# Patient Record
Sex: MALE | Race: WHITE
[De-identification: names, ages, dates, MRNs, and addresses within clinical notes are randomized per-mention and may not be internally consistent; named-entity substitution may affect disease eponyms.]

---

## 2021-04-13 DIAGNOSIS — Z23 ENCOUNTER FOR IMMUNIZATION: ICD-10-CM

## 2025-02-04 ENCOUNTER — HOSPITAL ENCOUNTER (INPATIENT)
Dept: HOSPITAL 99 - 2 SOUTH | Age: 75
LOS: 6 days | Discharge: NURSING, REHAB OR LONG-TERM CARE | DRG: 481 | End: 2025-02-10
Payer: COMMERCIAL

## 2025-02-04 VITALS — RESPIRATION RATE: 20 BRPM

## 2025-02-04 VITALS — RESPIRATION RATE: 18 BRPM | DIASTOLIC BLOOD PRESSURE: 92 MMHG | SYSTOLIC BLOOD PRESSURE: 190 MMHG

## 2025-02-04 VITALS — RESPIRATION RATE: 20 BRPM | SYSTOLIC BLOOD PRESSURE: 169 MMHG | DIASTOLIC BLOOD PRESSURE: 87 MMHG

## 2025-02-04 VITALS — RESPIRATION RATE: 12 BRPM

## 2025-02-04 VITALS — SYSTOLIC BLOOD PRESSURE: 133 MMHG | DIASTOLIC BLOOD PRESSURE: 72 MMHG | RESPIRATION RATE: 20 BRPM

## 2025-02-04 VITALS — RESPIRATION RATE: 15 BRPM | SYSTOLIC BLOOD PRESSURE: 190 MMHG | DIASTOLIC BLOOD PRESSURE: 92 MMHG

## 2025-02-04 VITALS — BODY MASS INDEX: 35.9 KG/M2

## 2025-02-04 VITALS — RESPIRATION RATE: 16 BRPM

## 2025-02-04 DIAGNOSIS — Z87.891: ICD-10-CM

## 2025-02-04 DIAGNOSIS — E78.00: ICD-10-CM

## 2025-02-04 DIAGNOSIS — Z79.82: ICD-10-CM

## 2025-02-04 DIAGNOSIS — S72.141A: Primary | ICD-10-CM

## 2025-02-04 DIAGNOSIS — N17.9: ICD-10-CM

## 2025-02-04 DIAGNOSIS — Z88.8: ICD-10-CM

## 2025-02-04 DIAGNOSIS — E11.22: ICD-10-CM

## 2025-02-04 DIAGNOSIS — N18.32: ICD-10-CM

## 2025-02-04 DIAGNOSIS — D62: ICD-10-CM

## 2025-02-04 DIAGNOSIS — S50.311A: ICD-10-CM

## 2025-02-04 DIAGNOSIS — Z79.84: ICD-10-CM

## 2025-02-04 DIAGNOSIS — I12.9: ICD-10-CM

## 2025-02-04 DIAGNOSIS — E05.90: ICD-10-CM
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
## 2025-02-04 DIAGNOSIS — E66.09: ICD-10-CM

## 2025-02-04 DIAGNOSIS — Z79.4: ICD-10-CM
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
## 2025-02-04 LAB
ALBUMIN SERPL-MCNC: 4.9 G/DL (ref 3.5–5)
ALP SERPL-CCNC: 61 U/L (ref 38–126)
ALT SERPL-CCNC: 25 U/L (ref 0–50)
AST SERPL-CCNC: 26 U/L (ref 17–59)
BUN SERPL-MCNC: 33 MG/DL (ref 9–20)
CALCIUM SERPL-MCNC: 9 MG/DL (ref 8.4–10.2)
CHLORIDE SERPL-SCNC: 104 MMOL/L (ref 98–107)
CO2 SERPL-SCNC: 25 MMOL/L (ref 22–30)
EGFR: 42.04
ERYTHROCYTE [DISTWIDTH] IN BLOOD BY AUTOMATED COUNT: 12.8 % (ref 11.5–14.5)
ESTIMATED CREATININE CLEARANCE: 44 ML/MIN
GLUCOSE - POINT OF CARE: 120 MG/DL (ref 70–99)
GLUCOSE - POINT OF CARE: 282 MG/DL (ref 70–99)
GLUCOSE SERPL-MCNC: 151 MG/DL (ref 70–99)
HCT VFR BLD AUTO: 41.2 % (ref 39–52)
HGB BLD-MCNC: 13.3 G/DL (ref 13–18)
MCHC RBC AUTO-ENTMCNC: 32.3 G/DL (ref 33–37)
MCV RBC AUTO: 93.2 FL (ref 80–94)
NRBC BLD AUTO-RTO: 0 %
PLATELET # BLD AUTO: 196 10^3/UL (ref 130–400)
POTASSIUM SERPL-SCNC: 5 MMOL/L (ref 3.5–5.1)
PROT SERPL-MCNC: 7.8 G/DL (ref 6.3–8.2)
SODIUM SERPL-SCNC: 140 MMOL/L (ref 135–145)

## 2025-02-04 PROCEDURE — C1713 ANCHOR/SCREW BN/BN,TIS/BN: HCPCS

## 2025-02-04 RX ADMIN — HYDROMORPHONE HYDROCHLORIDE 0.5 MG: 1 INJECTION, SOLUTION INTRAMUSCULAR; INTRAVENOUS; SUBCUTANEOUS at 17:26

## 2025-02-04 RX ADMIN — INSULIN GLARGINE 0.12 UNITS: 100 INJECTION, SOLUTION SUBCUTANEOUS at 21:32

## 2025-02-04 RX ADMIN — INSULIN ASPART: 100 INJECTION, SOLUTION INTRAVENOUS; SUBCUTANEOUS at 18:35

## 2025-02-04 RX ADMIN — REPAGLINIDE 1 MG: 1 TABLET ORAL at 18:35

## 2025-02-04 RX ADMIN — HYDROMORPHONE HYDROCHLORIDE 0.5 MG: 1 INJECTION, SOLUTION INTRAMUSCULAR; INTRAVENOUS; SUBCUTANEOUS at 13:10

## 2025-02-04 RX ADMIN — HYDROMORPHONE HYDROCHLORIDE 0.5 MG: 1 INJECTION, SOLUTION INTRAMUSCULAR; INTRAVENOUS; SUBCUTANEOUS at 23:12

## 2025-02-04 RX ADMIN — SODIUM CHLORIDE 1000: 900 INJECTION, SOLUTION INTRAVENOUS at 19:51

## 2025-02-04 RX ADMIN — CEFAZOLIN 10: 10 INJECTION, POWDER, FOR SOLUTION INTRAVENOUS at 21:32

## 2025-02-04 RX ADMIN — CARVEDILOL 12.5 MG: 12.5 TABLET, FILM COATED ORAL at 19:51

## 2025-02-04 RX ADMIN — ACETAMINOPHEN 1000 MG: 500 TABLET ORAL at 12:13

## 2025-02-04 NOTE — PTCARENOTE
At 16:50 patient arrived from ED on stretcher, pulled onto bed with assist x3, static air overlay in place, VSS, admission history obtained at bedside.

## 2025-02-04 NOTE — HPS.HSE
"Addendum entered and electronically signed by Marissa Barlow DO  02/04/25 15:35: "~"The patient is seen and examined. I have reviewed the patient with NP, Kiera, and I agree with her history and physical, assessment and plan of care as per below. The patient has right hip pain, that is greatly improved following IV Dilaudid in "~"the ED. "~"Vitals reviewed as per below"~"PE: "~"NAD"~"CV: RRR, no m/r/g"~"Lungs: CTA b/l no w/r/r"~"Abd: Soft, nt/nd, normal bs"~"neurovascular-intact distal pulses, no focal neurologic deficits"~"Right intertrochanteric fracture-I agree with assessment and plan of care as per below, with following additions to order:"~"#Intertrochanteric fx following mechanical fall"~"-add neurovascular checks"~"-continue pain meds prn"~"-PCDs"~"-hold aspirin for now"~"#Acute renal failure on chronic kidney disease, likely partly pre-renal"~"-will give IVF and repeat BMP in am, baseline creatinine likely 1.3"~"-hold metformin"~"-hold losartan for now"~"Original Note:"~"Family Physician"~"-"~"-: "~"Family Physician:  Wanda Ko PA-C"~"Chief Complaint"~"-"~"-: "~"fall"~"History of Present Illness"~"-: "~"Patient is a 74-year-old male with past medical history significant for hypertension, DM II, hypercholesterolemia, CKD stage III, and hyperthyroidism who presented to [place] ED for evaluation right hip pain s/p fall. Patient reports that he "~"tripped [place] to [facility]. He currently reports no pain after medication in ED. He denies hitting his head and any LOC. He denies any recent sick contact, fevers, chills, cough, shortness of breath, chest pain, nausea, vomiting, "~"constipation, diarrhea or urinary symptoms. "~"Medical History"~"Past Medical History"~"Past Medical History: Reports Other"~"Additional Past Medical History: "~"benign hypertension"~"DM II"~"hypercholesterolemia"~"CKD stage III"~"hyperthyroidism"~"Past Surgical History: Reports Other"~"Additional Past Surgical History: "~"back surgery"~"bladder surgery to remove mass"~"Left ankle repair"~"Social History"~"Tobacco: Former Smoker (quit 2-3 years ago, 30-40 pack year history )"~"Alcohol: Occasional (couple beers a couple times a week)"~"Drug: None"~"Personal: "~"Living: With Family"~"Employment: Retired"~"Family History"~"Family History: Not pertinent"~"Allergies / Home Medications"~"-: "~"Allergies reflects when Allergies were last updated in [employer]."~"Home Medications with original date entered in [employer] "~"Allergy/Medication List: "~"Allergies"~"Allergy/AdvReac	Type	Severity	Reaction	Status	Date / Time"~"atorvastatin	Allergy		Unknown	Verified	11/16/22 15:52"~"Home Medications"~"rosuvastatin 10 mg tablet (Crestor) 10 mg PO DAILY 02/21/14 "~"insulin glargine 100 unit/mL (3 mL) subcutaneous pen (Lantus Solostar U-100 Insulin) 25 unit SC HS 11/13/22 "~"metformin 500 mg tablet 500 mg PO BIDWMEAL 11/13/22 "~"aspirin 81 mg tablet,delayed release 81 mg PO DAILY 11/16/22 "~"carvedilol 12.5 mg tablet 12.5 mg PO BID 11/16/22 "~"amlodipine 5 mg tablet 5 mg PO DAILY #30 tabs 11/19/22 "~"methimazole 5 mg tablet 5 mg PO DAILY #30 tabs 11/19/22 "~"empagliflozin 10 mg tablet (Jardiance) 10 mg PO DAILY 02/04/25 "~"losartan 50 mg tablet 50 mg PO DAILY 02/04/25 "~"repaglinide 1 mg tablet 1 mg PO AC 02/04/25 "~"Review of Systems"~"-"~"History Source: Patient"~"Constitutional: Reports No Symptoms"~"EENT: Reports No Symptoms"~"Respiratory: Reports No Symptoms"~"Cardiac: Reports No Symptoms"~"Abdomen/GI: Reports No Symptoms"~": Reports No Symptoms"~"Musculoskeletal: Reports Joint Pain (right hip)"~"Skin: Reports No Symptoms"~"Neurological: Reports No Symptoms"~"Endocrine: Reports No Symptoms"~"Hematologic/Lymphatic: Reports No Symptoms"~"Psych: Reports No Symptoms"~"Physical Exam"~"Vital Signs"~"-: "~"Vital Signs"~"Temp	Pulse	Resp	BP	Pulse Ox"~" 98.0 F 	 56 	 12 	 190/92 	 98 "~" 02/04/25 11:43	 02/04/25 12:15	 02/04/25 12:15	 02/04/25 11:46	 02/04/25 12:15"~"Physical Exam"~"General: Well Developed, Well Nourished, No Apparent Distress, Comfortable, Conversant and Obese"~"HEENT: NormoCephalic, Moist mucous membranes, Atraumatic, PERRLA, Pink Conjunctivae, Nose Appears Normal and Ears Appear Normal"~"Respiratory: Clear and Non Labored Respirations"~"Cardiac: S1/S2 and Regular Rhythm; No Murmur, Rub or Gallop"~"Breast: Deferred by me"~"GI: Soft, Non Tender, Non Distended and Normal Bowel Sounds; No Organomegaly"~"Rectal: Deferred by Provider"~"Genito-urinary: Deferred by me"~"Musculoskeletal: No Clubbing, No Cyanosis, No Edema and Other (limited ROM to right hip, externally rotated )"~"Skin: IV/Catheter Site; No Rash"~"Neuro: Awake, Alert, AO x 3 and Nonfocal/grossly intact"~"Psych: Calm and Intact Judgment/Insight"~"Laboratory Results"~"-"~"-: "~"02/04/25 12:53 "~"02/04/25 12:53 "~"Laboratory Results"~"Total Bilirubin	 0.6 mg/dl (0.2-1.3)  	02/04/25  12:53    "~"AST	 26 U/L (17-59)  	02/04/25  12:53    "~"ALT	 25 U/L (0-50)  	02/04/25  12:53    "~"Alkaline Phosphatase	 61 U/L ()  	02/04/25  12:53    "~"Data Reviewed"~"-"~"Diagnostic Radiology: Report Reviewed by me (Right Hip: Comminuted intertrochanteric fracture, proximal right femur.; Right femur: Intertrochanteric fracture, proximal right femur.)"~"Lab Data: Labs Reviewed by me (BUN 33, Creat 1.7)"~"Impression/Plan"~"-"~"-: "~"IMPRESSION/PLAN: "~"#mechanical fall "~"Rt Hip x-ray: Comminuted intertrochanteric fracture, proximal right femur."~"Rt Femur x-ray: Intertrochanteric fracture, proximal right femur."~" - Admit to med/surg"~" - consult ortho"~" - NPO after midnight "~" - PT/OT"~"#benign hypertension"~" - continue amlodipine, carvedilol, and losartan "~"#DM II"~" - AccuCheck AC & HS"~" - SSI"~" - continue empagliflozin, Lantus and repaglinide"~" - hold metformin"~" "~"#hypercholesterolemia"~" - continue rosuvastatin"~"#CKD stage III"~"BUN 33, Creat 1.7"~" - monitor BMP"~"#hyperthyroidism"~" - continue methimazole "~"Code status: Full Code"~"DVT prophylaxis: DVT Prophylaxis"

## 2025-02-05 VITALS — SYSTOLIC BLOOD PRESSURE: 131 MMHG | DIASTOLIC BLOOD PRESSURE: 92 MMHG | RESPIRATION RATE: 18 BRPM

## 2025-02-05 VITALS — RESPIRATION RATE: 18 BRPM | SYSTOLIC BLOOD PRESSURE: 114 MMHG | DIASTOLIC BLOOD PRESSURE: 81 MMHG

## 2025-02-05 VITALS — SYSTOLIC BLOOD PRESSURE: 137 MMHG | RESPIRATION RATE: 18 BRPM | DIASTOLIC BLOOD PRESSURE: 80 MMHG

## 2025-02-05 VITALS
SYSTOLIC BLOOD PRESSURE: 134 MMHG | RESPIRATION RATE: 8 BRPM | DIASTOLIC BLOOD PRESSURE: 73 MMHG | OXYGEN SATURATION: 2 %

## 2025-02-05 VITALS — RESPIRATION RATE: 15 BRPM

## 2025-02-05 VITALS — SYSTOLIC BLOOD PRESSURE: 164 MMHG | DIASTOLIC BLOOD PRESSURE: 80 MMHG | RESPIRATION RATE: 20 BRPM

## 2025-02-05 VITALS — DIASTOLIC BLOOD PRESSURE: 83 MMHG | RESPIRATION RATE: 20 BRPM | SYSTOLIC BLOOD PRESSURE: 167 MMHG

## 2025-02-05 VITALS — SYSTOLIC BLOOD PRESSURE: 152 MMHG | DIASTOLIC BLOOD PRESSURE: 99 MMHG | OXYGEN SATURATION: 2 %

## 2025-02-05 VITALS — RESPIRATION RATE: 12 BRPM

## 2025-02-05 VITALS
OXYGEN SATURATION: 2 % | DIASTOLIC BLOOD PRESSURE: 74 MMHG | RESPIRATION RATE: 13 BRPM | SYSTOLIC BLOOD PRESSURE: 134 MMHG

## 2025-02-05 VITALS — SYSTOLIC BLOOD PRESSURE: 146 MMHG | RESPIRATION RATE: 16 BRPM | DIASTOLIC BLOOD PRESSURE: 82 MMHG

## 2025-02-05 VITALS — OXYGEN SATURATION: 2 %

## 2025-02-05 LAB
BUN SERPL-MCNC: 33 MG/DL (ref 9–20)
CALCIUM SERPL-MCNC: 8.6 MG/DL (ref 8.4–10.2)
CHLORIDE SERPL-SCNC: 105 MMOL/L (ref 98–107)
CO2 SERPL-SCNC: 23 MMOL/L (ref 22–30)
EGFR: 42.04
ERYTHROCYTE [DISTWIDTH] IN BLOOD BY AUTOMATED COUNT: 13 % (ref 11.5–14.5)
ESTIMATED CREATININE CLEARANCE: 44 ML/MIN
GLUCOSE - POINT OF CARE: 105 MG/DL (ref 70–99)
GLUCOSE - POINT OF CARE: 121 MG/DL (ref 70–99)
GLUCOSE - POINT OF CARE: 134 MG/DL (ref 70–99)
GLUCOSE - POINT OF CARE: 141 MG/DL (ref 70–99)
GLUCOSE - POINT OF CARE: 195 MG/DL (ref 70–99)
GLUCOSE SERPL-MCNC: 228 MG/DL (ref 70–99)
HBA1C MFR BLD: 8.2 % (ref 4–5.6)
HCT VFR BLD AUTO: 37.5 % (ref 39–52)
HGB BLD-MCNC: 12.3 G/DL (ref 13–18)
MCHC RBC AUTO-ENTMCNC: 32.8 G/DL (ref 33–37)
MCV RBC AUTO: 92.6 FL (ref 80–94)
PLATELET # BLD AUTO: 189 10^3/UL (ref 130–400)
POTASSIUM SERPL-SCNC: 4.4 MMOL/L (ref 3.5–5.1)
SODIUM SERPL-SCNC: 139 MMOL/L (ref 135–145)

## 2025-02-05 PROCEDURE — 0QS636Z REPOSITION RIGHT UPPER FEMUR WITH INTRAMEDULLARY INTERNAL FIXATION DEVICE, PERCUTANEOUS APPROACH: ICD-10-PCS | Performed by: ORTHOPAEDIC SURGERY

## 2025-02-05 RX ADMIN — HYDROMORPHONE HYDROCHLORIDE 0.25 MG: 0.25 INJECTION, SOLUTION INTRAMUSCULAR; INTRAVENOUS; SUBCUTANEOUS at 20:06

## 2025-02-05 RX ADMIN — SODIUM CHLORIDE 1000: 900 INJECTION, SOLUTION INTRAVENOUS at 07:27

## 2025-02-05 RX ADMIN — CARVEDILOL 12.5 MG: 12.5 TABLET, FILM COATED ORAL at 21:31

## 2025-02-05 RX ADMIN — INSULIN ASPART: 100 INJECTION, SOLUTION INTRAVENOUS; SUBCUTANEOUS at 17:48

## 2025-02-05 RX ADMIN — INSULIN GLARGINE 0.12 UNITS: 100 INJECTION, SOLUTION SUBCUTANEOUS at 21:54

## 2025-02-05 RX ADMIN — HYDROMORPHONE HYDROCHLORIDE 0.5 MG: 1 INJECTION, SOLUTION INTRAMUSCULAR; INTRAVENOUS; SUBCUTANEOUS at 20:28

## 2025-02-05 RX ADMIN — METHIMAZOLE 5 MG: 5 TABLET ORAL at 08:51

## 2025-02-05 RX ADMIN — REPAGLINIDE: 1 TABLET ORAL at 11:49

## 2025-02-05 RX ADMIN — HYDROMORPHONE HYDROCHLORIDE 0.5 MG: 1 INJECTION, SOLUTION INTRAMUSCULAR; INTRAVENOUS; SUBCUTANEOUS at 20:13

## 2025-02-05 RX ADMIN — HYDROMORPHONE HYDROCHLORIDE 0.5 MG: 1 INJECTION, SOLUTION INTRAMUSCULAR; INTRAVENOUS; SUBCUTANEOUS at 13:27

## 2025-02-05 RX ADMIN — CARVEDILOL 12.5 MG: 12.5 TABLET, FILM COATED ORAL at 08:52

## 2025-02-05 RX ADMIN — HYDROMORPHONE HYDROCHLORIDE 0.5 MG: 1 INJECTION, SOLUTION INTRAMUSCULAR; INTRAVENOUS; SUBCUTANEOUS at 08:54

## 2025-02-05 RX ADMIN — INSULIN ASPART: 100 INJECTION, SOLUTION INTRAVENOUS; SUBCUTANEOUS at 11:45

## 2025-02-05 RX ADMIN — REPAGLINIDE: 1 TABLET ORAL at 08:52

## 2025-02-05 RX ADMIN — AMLODIPINE BESYLATE 5 MG: 5 TABLET ORAL at 08:51

## 2025-02-05 RX ADMIN — ANTI-FUNGAL POWDER MICONAZOLE NITRATE TALC FREE 1 APPLIC: 1.42 POWDER TOPICAL at 12:55

## 2025-02-05 RX ADMIN — SODIUM CHLORIDE 1000: 900 INJECTION, SOLUTION INTRAVENOUS at 21:30

## 2025-02-05 RX ADMIN — HYDROMORPHONE HYDROCHLORIDE 0.5 MG: 1 INJECTION, SOLUTION INTRAMUSCULAR; INTRAVENOUS; SUBCUTANEOUS at 04:03

## 2025-02-05 RX ADMIN — DAPAGLIFLOZIN 10 MG: 10 TABLET, FILM COATED ORAL at 08:52

## 2025-02-05 RX ADMIN — DOCUSATE SODIUM 100 MG: 100 CAPSULE, LIQUID FILLED ORAL at 21:31

## 2025-02-05 RX ADMIN — REPAGLINIDE: 1 TABLET ORAL at 15:39

## 2025-02-05 RX ADMIN — ANTI-FUNGAL POWDER MICONAZOLE NITRATE TALC FREE 1 APPLIC: 1.42 POWDER TOPICAL at 21:31

## 2025-02-05 RX ADMIN — INSULIN ASPART 1 UNITS: 100 INJECTION, SOLUTION INTRAVENOUS; SUBCUTANEOUS at 09:51

## 2025-02-05 RX ADMIN — ROSUVASTATIN CALCIUM 10 MG: 10 TABLET, FILM COATED ORAL at 08:52

## 2025-02-05 NOTE — OR.RPT
"Operative Report"~"Operative Report"~"-: "~"Anesthesia Type:"~"General"~"Operative Indications:"~"Right"~"Intertrochanteric femur fracture"~"Operative Findings :"~"Predominantly right basicervical femoral fracture pattern with some comminution medial calcar"~"Complications:"~"None"~"Implants:"~"11 mm x 125 degree short gamma nail, [place], 110 mm cephalomedullary screw, 35 mm x 5 mm distal interlocking screw"~"Procedure and Technique:"~"Insertion right short cephalomedullary nail"~"INDICATIONS FOR PROCEDURE:"~"74-year-old patient presented  status post mechanical fall.  They were subsequently diagnosed with a peritrochanteric femur fracture.  Orthopedics was consulted for further evaluation and treatment.  After discussion with the patient and  family, "~"the decision was made to proceed with operative intervention in the form of short cephalomedullary nail.  A long discussion was had regarding risks and benefits of procedure.  Risks include but are not limited to infection, blood loss, damage to "~"surrounding structures, persistent pain, loss of function, need for repeat surgery, implant cut out, periprosthetic fracture, DVT/PE and adverse risks of anesthesia.  Benefits include early mobilization and fracture stabilization.  After discussion "~"written informed consent was obtained."~"OPERATIVE PROCEDURE:"~"The patient was seen and identified in the preoperative holding area.  The operative extremity was marked and all questions were addressed with the patient.  Patient was taken to the operating room and provided anesthesia by the anesthesia team.  "~"They were placed supine on a radiolucent fracture table.  The nonoperative extremity was placed in a scissored position and well padded to the contralalteral post of the fracture table.  Operative extremity was placed in a well-padded fracture boot. "~" Biplanar fluoroscopy confirmed appropriate reduction after axial traction, adduction and slight internal rotation of the operative extremity. Operative extremity was then prepped and draped in normal sterile fashion.  Timeout was performed again "~"identifying the operative extremity correctly.  Preoperative antibiotics were addressed."~"A small incision was made several fingerbreadths proximal to the greater trochanter.  Sharp dissection was carried through skin and subcutaneous tissues and deep fascial layers.  Guidepin was then inserted under biplanar fluoroscopic guidance "~"through the tip of the greater trochanter in accordance with the implant's operative technique.  This was inserted to a depth just distal to the lesser trochanter.  Proximal opening reamer was then utilized. A short cephalomedullary nail was then "~"inserted to the appropriate depth.  Trocar was then inserted through the aiming arm.  Sharp dissection was then carried through skin and subcutaneous tissues as well as deep fascial layers for an additional stab incision for the cephalomedullary "~"screw.  Guidewire was inserted through the trocar into the femoral neck and head.  Appropriate position was confirmed under biplanar fluoroscopy.  Attention was made to minimize the tip apex distance.  Measurements were obtained for the "~"cephalomedullary screw.  Cannulated drill was then utilized to the appropriate depth followed by the insertion of cannulated cephalomedullary screw. Appropriate final position of the screw within the confines of the femoral neck and head was "~"confirmed again on biplanar fluoroscopy.  There was noted to be some comminution medial calcar/femoral neck junction inferiorly. additional trocar was then inserted through the aiming arm for the distal interlocking screw.  Sharp dissection was "~"carried through skin, subcutaneous tissues and deep fascial layers.  Appropriate length interlocking screw was then drilled and inserted.  Final appropriate positioning was confirmed again on biplanar fluoroscopy.  Satisfied with the extent of "~"surgery, wounds were copiously irrigated with normal saline solution and closed in a layered fashion utilizing 0 Vicryl for deep fascial layer, 2-0 Vicryl for subcu cutaneous layer and staples for skin.  Sterile dressings were applied.  Anesthesia "~"was reversed and patient was taken to the operating room in a stable condition.  "~"Disposition:"~"PACU stable condition "

## 2025-02-05 NOTE — CM
" reviewed patient's chart and patient lives with his spouse in a bilevel home with 6 steps up to kitchen and 6 steps to bedroom, patient was independent with adl's and ambulation, no dme, plan now is for skilled placement and options "~"reviewed with patient and spouse and they have selected [facility], referral sent to admissions at [facility]."~"PCP; Wanda Ko"~"Pharmacy; [facility] in [place]. "

## 2025-02-05 NOTE — W.PN.HOSP.TC
"Today's Communication/Plan"~"-"~"-: "~". "~"Assessment / Plan"~"Assessment / Plan"~"-: "~"1. Right Femur Intertrochanteric Fracture s/p Mechanical Fall"~"- R Hip XR: Comminuted intertrochanteric fracture, proximal right femur"~"- R Femur XR: Intertrochanteric fracture, proximal right femur"~"- Seen by ortho, plan for OR today for R cephalomedullary nail"~"- NPO until after procedure"~"- PT/OT after procedure"~"2. Essential HTN"~"- Continue amlodipine, carvedilol"~"- Losartan held pre-procedurally"~"3. NIDDM"~" - AccuCheck AC & HS"~" - Sliding Scale Insulin"~" - continue empagliflozin, Lantus and repaglinide"~" - hold metformin"~" "~"4. hypercholesterolemia"~" - continue rosuvastatin"~"5. CKD stage III"~"- Creatinine 1.7 on admission, repeat this AM 1.7 despite IV fluids "~"- [employer] has had 2 outpatient CMPs in the past year with Cr 1.7 (this is likely his new baseline) "~"- Patient has spoken with PCP regarding need to follow up with nephrologist for worsening kidney function"~"6. Hyperthyroidism"~" - Continue methimazole "~"Anticipated Discharge: 24 - 48 hours"~"Subjective/Interval History"~"-"~"-: "~"Date of Service:  February 5, 2025"~"Patient seen and examined while resting in bed, awaiting orthopedic surgery. Patient has no other acute complaints this AM. Went over history of the injury, briefly, states that he tripped over his own feet, fell, and knew immediately that he has "~"fractured something. Patient denies any chest pain, palpitations, shortness of breath or dizziness prior to the event, or loss of consciousness before or after the event. "~"Patient notes that he has been seen by primary care physician over the past year, and that they have discussed worsening kidney function, as well as the need to visit a nephrologist. "~"Objective Data"~"-"~"Labs: "~"Laboratory Results"~" 	02/05/25"~" 	05:05"~"WBC	 11.2 H"~"Hgb	 12.3 L"~"Hct	 37.5 L"~"Plt Count	 189"~"Sodium	 139"~"Potassium	 4.4"~"Chloride	 105"~"Carbon Dioxide	 23"~"BUN	 33 H"~"Creatinine	 1.7 H"~"Glucose	 228 H"~"Calcium	 8.6"~"Vital Signs: "~"Vital Signs"~"Temp	Pulse	Resp	BP	Pulse Ox"~" 98.7 F 	 63 	 20 	 167/83 	 97 "~" 02/05/25 07:49	 02/05/25 07:49	 02/05/25 07:49	 02/05/25 07:49	 02/05/25 07:49"~"I&O"~"	02/04/25	02/05/25	02/06/25"~"	06:59	06:59	06:59"~"Intake Total		960 / 960	"~"Output Total		600 / 600	"~"Balance		360 / 360	"~"Review of Systems"~"-"~"History Source: Patient"~"Constitutional: Reports No Symptoms"~"Respiratory: Reports No Symptoms"~"Cardiac: Reports No Symptoms"~"Abdomen/GI: Reports No Symptoms"~"Musculoskeletal: Reports Joint Pain (right hip)"~"Neuro: Reports No Symptoms"~"Physical Exam"~"-"~"General: No Apparent Distress, Conversant and Morbidly Obese"~"HEENT: Normocephalic, Atraumatic and Moist Mucous Membranes"~"Respiratory: Clear to Auscultation and Non Labored Respirations; Negative Wheezes, Rales or Rhonchi"~"Cardiac: Regular Rhythm and S1/S2"~"GI: Soft and Nontender"~"Musculoskeletal: No Clubbing, No Cyanosis, No Edema and Other (right hip, externally rotated)"~"Skin: Warm and Dry"~"Neuro: Awake and Alert"~"Psych: Calm"~"Data Reviewed"~"-"~"Diagnostic Radiology: Report Reviewed by me"~"Labs: Labs Reviewed by me and Discussed with Patient"

## 2025-02-05 NOTE — PTCARENOTE
Pt arrive to [facility] at 2110 from the PACU in a bed. Pt arrive with 2 Aquacells on Right hip; both C/D/I. Pt on 2L of o2. Full head to toe assessed. Pt oriented to room and call bell. Care ongoing.

## 2025-02-06 VITALS — SYSTOLIC BLOOD PRESSURE: 155 MMHG | RESPIRATION RATE: 18 BRPM | DIASTOLIC BLOOD PRESSURE: 86 MMHG

## 2025-02-06 VITALS — SYSTOLIC BLOOD PRESSURE: 167 MMHG | DIASTOLIC BLOOD PRESSURE: 86 MMHG | RESPIRATION RATE: 18 BRPM

## 2025-02-06 VITALS — DIASTOLIC BLOOD PRESSURE: 77 MMHG | RESPIRATION RATE: 18 BRPM | SYSTOLIC BLOOD PRESSURE: 144 MMHG

## 2025-02-06 VITALS — RESPIRATION RATE: 18 BRPM | SYSTOLIC BLOOD PRESSURE: 154 MMHG | DIASTOLIC BLOOD PRESSURE: 79 MMHG

## 2025-02-06 VITALS — RESPIRATION RATE: 16 BRPM | SYSTOLIC BLOOD PRESSURE: 153 MMHG | DIASTOLIC BLOOD PRESSURE: 75 MMHG

## 2025-02-06 VITALS — OXYGEN SATURATION: 96 % | DIASTOLIC BLOOD PRESSURE: 83 MMHG | HEART RATE: 89 BPM | SYSTOLIC BLOOD PRESSURE: 191 MMHG

## 2025-02-06 VITALS
HEART RATE: 89 BPM | DIASTOLIC BLOOD PRESSURE: 79 MMHG | RESPIRATION RATE: 17 BRPM | OXYGEN SATURATION: 96 % | SYSTOLIC BLOOD PRESSURE: 189 MMHG

## 2025-02-06 LAB
BUN SERPL-MCNC: 30 MG/DL (ref 9–20)
CALCIUM SERPL-MCNC: 8.3 MG/DL (ref 8.4–10.2)
CHLORIDE SERPL-SCNC: 106 MMOL/L (ref 98–107)
CO2 SERPL-SCNC: 19 MMOL/L (ref 22–30)
EGFR: 45.21
ERYTHROCYTE [DISTWIDTH] IN BLOOD BY AUTOMATED COUNT: 13.1 % (ref 11.5–14.5)
ESTIMATED CREATININE CLEARANCE: 47 ML/MIN
GLUCOSE - POINT OF CARE: 158 MG/DL (ref 70–99)
GLUCOSE - POINT OF CARE: 197 MG/DL (ref 70–99)
GLUCOSE - POINT OF CARE: 244 MG/DL (ref 70–99)
GLUCOSE - POINT OF CARE: 257 MG/DL (ref 70–99)
GLUCOSE SERPL-MCNC: 133 MG/DL (ref 70–99)
HCT VFR BLD AUTO: 36.3 % (ref 39–52)
HGB BLD-MCNC: 11.7 G/DL (ref 13–18)
MCHC RBC AUTO-ENTMCNC: 32.2 G/DL (ref 33–37)
MCV RBC AUTO: 94 FL (ref 80–94)
PLATELET # BLD AUTO: 181 10^3/UL (ref 130–400)
POTASSIUM SERPL-SCNC: 4.9 MMOL/L (ref 3.5–5.1)
SODIUM SERPL-SCNC: 139 MMOL/L (ref 135–145)
SQUAMOUS URNS QL MICRO: (no result) /LPF
URINE RED BLOOD CELL: (no result) /HPF (ref 0–2)
URINE WHITE CELL: (no result) /HPF (ref 0–5)

## 2025-02-06 RX ADMIN — METHIMAZOLE 5 MG: 5 TABLET ORAL at 08:28

## 2025-02-06 RX ADMIN — ENOXAPARIN SODIUM 40 MG: 40 INJECTION SUBCUTANEOUS at 08:29

## 2025-02-06 RX ADMIN — CEFAZOLIN 5: 10 INJECTION, POWDER, FOR SOLUTION INTRAVENOUS at 08:29

## 2025-02-06 RX ADMIN — DOCUSATE SODIUM 100 MG: 100 CAPSULE, LIQUID FILLED ORAL at 08:28

## 2025-02-06 RX ADMIN — CARVEDILOL 12.5 MG: 12.5 TABLET, FILM COATED ORAL at 21:00

## 2025-02-06 RX ADMIN — INSULIN ASPART 1 UNITS: 100 INJECTION, SOLUTION INTRAVENOUS; SUBCUTANEOUS at 12:35

## 2025-02-06 RX ADMIN — SODIUM CHLORIDE 1000: 900 INJECTION, SOLUTION INTRAVENOUS at 08:27

## 2025-02-06 RX ADMIN — REPAGLINIDE 1 MG: 1 TABLET ORAL at 17:35

## 2025-02-06 RX ADMIN — REPAGLINIDE 1 MG: 1 TABLET ORAL at 08:31

## 2025-02-06 RX ADMIN — ROSUVASTATIN CALCIUM 10 MG: 10 TABLET, FILM COATED ORAL at 08:28

## 2025-02-06 RX ADMIN — AMLODIPINE BESYLATE 5 MG: 5 TABLET ORAL at 08:28

## 2025-02-06 RX ADMIN — INSULIN GLARGINE 0.12 UNITS: 100 INJECTION, SOLUTION SUBCUTANEOUS at 21:21

## 2025-02-06 RX ADMIN — DAPAGLIFLOZIN 10 MG: 10 TABLET, FILM COATED ORAL at 08:28

## 2025-02-06 RX ADMIN — REPAGLINIDE 1 MG: 1 TABLET ORAL at 12:34

## 2025-02-06 RX ADMIN — OXYCODONE HYDROCHLORIDE 10 MG: 10 TABLET ORAL at 12:34

## 2025-02-06 RX ADMIN — CARVEDILOL 12.5 MG: 12.5 TABLET, FILM COATED ORAL at 08:29

## 2025-02-06 RX ADMIN — CEFAZOLIN 5: 10 INJECTION, POWDER, FOR SOLUTION INTRAVENOUS at 00:32

## 2025-02-06 RX ADMIN — ANTI-FUNGAL POWDER MICONAZOLE NITRATE TALC FREE 1 APPLIC: 1.42 POWDER TOPICAL at 08:32

## 2025-02-06 RX ADMIN — OXYCODONE HYDROCHLORIDE 10 MG: 10 TABLET ORAL at 08:28

## 2025-02-06 RX ADMIN — OXYCODONE HYDROCHLORIDE 10 MG: 10 TABLET ORAL at 02:03

## 2025-02-06 RX ADMIN — ANTI-FUNGAL POWDER MICONAZOLE NITRATE TALC FREE 1 APPLIC: 1.42 POWDER TOPICAL at 21:00

## 2025-02-06 RX ADMIN — INSULIN ASPART 1 UNITS: 100 INJECTION, SOLUTION INTRAVENOUS; SUBCUTANEOUS at 08:32

## 2025-02-06 RX ADMIN — DOCUSATE SODIUM 100 MG: 100 CAPSULE, LIQUID FILLED ORAL at 21:00

## 2025-02-06 RX ADMIN — SODIUM CHLORIDE 1000: 900 INJECTION, SOLUTION INTRAVENOUS at 18:24

## 2025-02-06 RX ADMIN — INSULIN ASPART 2 UNITS: 100 INJECTION, SOLUTION INTRAVENOUS; SUBCUTANEOUS at 17:33

## 2025-02-06 NOTE — W.PN.HOSP.TC
"Today's Communication/Plan"~"-"~"-: "~"Disposition planning s/p PT/OT "~"Assessment / Plan"~"Assessment / Plan"~"-: "~"1. Right Femur Intertrochanteric Fracture s/p Mechanical Fall"~"- R Hip XR: Comminuted intertrochanteric fracture, proximal right femur"~"- R Femur XR: Intertrochanteric fracture, proximal right femur"~"- POD# for cephalomedullary nail of the right hip/femur"~"- PT/OT for disposition planning, other wise medically stable for discharge. "~"2. Essential HTN"~"- Continue amlodipine, carvedilol, losartan"~"3. NIDDM"~" - AccuCheck AC & HS"~" - Sliding Scale Insulin"~" - continue empagliflozin, Lantus and repaglinide"~" - hold metformin"~" "~"4. hypercholesterolemia"~" - continue rosuvastatin"~"5. CKD stage III"~"- Creatinine 1.7 on admission, repeat this AM 1.7 despite IV fluids "~"- [employer]eried [employer], has had 2 outpatient CMPs in the past year with Cr 1.6 (this is likely his new baseline) "~"- Patient has spoken with PCP regarding need to follow up with nephrologist for worsening kidney function"~"6. Hyperthyroidism"~" - Continue methimazole "~"Anticipated Discharge: Today"~"Subjective/Interval History"~"-"~"-: "~"Date of Service:  February 6, 2025"~"Patient seen and examined while resting in bed, his wife is at bedside. Patient has no acute complaints, notes some residual pain of the RLE. "~"Objective Data"~"-"~"Labs: "~"Laboratory Results"~" 	02/06/25"~" 	05:08"~"WBC	 14.6 H"~"Hgb	 11.7 L"~"Hct	 36.3 L"~"Plt Count	 181"~"Sodium	 139"~"Potassium	 4.9"~"Chloride	 106"~"Carbon Dioxide	 19 L"~"BUN	 30 H"~"Creatinine	 1.6 H"~"Glucose	 133 H"~"Calcium	 8.3 L"~"Vital Signs: "~"Vital Signs"~"Temp	Pulse	Resp	BP	Pulse Ox"~" 98.1 F 	 77 	 16 	 153/75 	 97 "~" 02/06/25 07:20	 02/06/25 07:20	 02/06/25 07:20	 02/06/25 08:29	 02/06/25 08:22"~"I&O"~"	02/05/25	02/06/25	02/07/25"~"	06:59	06:59	06:59"~"Intake Total	960 / 960	2380 / 2380	"~"Output Total	600 / 600	2070 / 2070	750 / 750"~"Balance	360 / 360	310 / 310	-750 / -750"~"Review of Systems"~"-"~"Constitutional: Reports No Symptoms"~"Respiratory: Reports No Symptoms"~"Cardiac: Reports No Symptoms"~"Musculoskeletal: Reports Other (lower extremity pain, RLE)"~"Neuro: Reports No Symptoms"~"Physical Exam"~"-"~"General: Well Developed, Well Nourished, No Apparent Distress and Comfortable"~"HEENT: Normocephalic and Atraumatic"~"Respiratory: Clear to Auscultation; Negative Wheezes, Rales or Rhonchi"~"Cardiac: Regular Rhythm and S1/S2"~"GI: Soft and Nontender"~"Musculoskeletal: No Clubbing, No Cyanosis and No Edema"~"Skin: Warm and Dry"~"Neuro: Awake, Alert and Oriented"~"Psych: Calm"~"Data Reviewed"~"-"~"Labs: Labs Reviewed by me"

## 2025-02-06 NOTE — W.PN.ORTHO
"Today's Communication / Plan"~"-"~"-: "~"74-year-old male postop day 1 status post right short cephalomedullary nail for basicervical femoral neck fracture doing well"~"Weightbearing as tolerated right lower extremity"~"PT OT"~"Pain control"~"DVT prophylaxis: Recommend Lovenox renally dosed x 28 days daily"~"Medical management per primary team"~"Plan to follow-up with myself in outpatient basis in 2 to 3 weeks for repeat evaluation plan removal of staples"~"Subjective"~"."~".: "~"Patient comfortable in chair at bedside.  Family with him at bedside."~"Vital Signs and Labs"~"."~"Vital Signs and Labs: "~"Lab Results"~"02/06/25 05:08 "~"02/06/25 05:08 "~"Temp	Pulse	Resp	BP	Pulse Ox"~" 98.4 F 	 88 	 17 	 167/79 	 98 "~" 02/06/25 15:30	 02/06/25 15:30	 02/06/25 15:30	 02/06/25 15:30	 02/06/25 15:30"~"Physical Exam"~"-"~"-: "~"Musculoskeletal right lower extremity"~"Minimal bloody drainage on dressing"~"Moderate swelling thigh"~"Positive EHL, FHL, ankle dorsiflexion, plantarflexion"

## 2025-02-06 NOTE — CM
"Addendum entered by Carolina Corcoran  02/06/25 16:58: "~"Spoke with Mary from [facility]  - can accept tomorrow"~"Called [phone] to initiate auth"~"Spoke with Fidelia"~"Clinicals to be faxed to [phone]"~"Plan - transfer to [facility] when auth obtained"~"R - [phone]"~"F - [phone]"~"Original Note:"~"Met with pt and his wife at bedside"~"PT/OT evals pending"~"Referral sent to [facility] - per Zheng - reviewing - will need PT/OT evals - will update referral when evals completed"~"Family updated"~"Will need auth"~"Plan - awaiting review by [facility] regarding acceptance; will need auth once bed obtained"

## 2025-02-06 NOTE — W.PN.UPDATE
"Update Note"~"Progress Note Update"~"-: "~"RN notified NP, patient with urge to void, unable to void more than 50cc post surgery. PVR 1400, will order to straight cath per protocol, Collect UA. Afebrile. Stable VS. "

## 2025-02-07 VITALS — SYSTOLIC BLOOD PRESSURE: 140 MMHG | DIASTOLIC BLOOD PRESSURE: 70 MMHG | OXYGEN SATURATION: 97 % | HEART RATE: 70 BPM

## 2025-02-07 VITALS — RESPIRATION RATE: 18 BRPM | SYSTOLIC BLOOD PRESSURE: 124 MMHG | DIASTOLIC BLOOD PRESSURE: 63 MMHG

## 2025-02-07 VITALS — DIASTOLIC BLOOD PRESSURE: 73 MMHG | RESPIRATION RATE: 16 BRPM | SYSTOLIC BLOOD PRESSURE: 145 MMHG

## 2025-02-07 VITALS — DIASTOLIC BLOOD PRESSURE: 72 MMHG | SYSTOLIC BLOOD PRESSURE: 163 MMHG | RESPIRATION RATE: 14 BRPM

## 2025-02-07 LAB
GLUCOSE - POINT OF CARE: 152 MG/DL (ref 70–99)
GLUCOSE - POINT OF CARE: 233 MG/DL (ref 70–99)
GLUCOSE - POINT OF CARE: 278 MG/DL (ref 70–99)
GLUCOSE - POINT OF CARE: 355 MG/DL (ref 70–99)

## 2025-02-07 RX ADMIN — INSULIN GLARGINE 0.12 UNITS: 100 INJECTION, SOLUTION SUBCUTANEOUS at 21:55

## 2025-02-07 RX ADMIN — OXYCODONE HYDROCHLORIDE 10 MG: 10 TABLET ORAL at 06:37

## 2025-02-07 RX ADMIN — ROSUVASTATIN CALCIUM 10 MG: 10 TABLET, FILM COATED ORAL at 09:25

## 2025-02-07 RX ADMIN — REPAGLINIDE 1 MG: 1 TABLET ORAL at 17:22

## 2025-02-07 RX ADMIN — SODIUM CHLORIDE 1000: 900 INJECTION, SOLUTION INTRAVENOUS at 04:43

## 2025-02-07 RX ADMIN — INSULIN ASPART 1 UNITS: 100 INJECTION, SOLUTION INTRAVENOUS; SUBCUTANEOUS at 09:26

## 2025-02-07 RX ADMIN — DOCUSATE SODIUM 100 MG: 100 CAPSULE, LIQUID FILLED ORAL at 20:04

## 2025-02-07 RX ADMIN — CARVEDILOL 12.5 MG: 12.5 TABLET, FILM COATED ORAL at 20:05

## 2025-02-07 RX ADMIN — ANTI-FUNGAL POWDER MICONAZOLE NITRATE TALC FREE 1 APPLIC: 1.42 POWDER TOPICAL at 12:53

## 2025-02-07 RX ADMIN — OXYCODONE HYDROCHLORIDE 5 MG: 5 TABLET ORAL at 12:56

## 2025-02-07 RX ADMIN — DOCUSATE SODIUM 100 MG: 100 CAPSULE, LIQUID FILLED ORAL at 09:24

## 2025-02-07 RX ADMIN — METHIMAZOLE 5 MG: 5 TABLET ORAL at 09:25

## 2025-02-07 RX ADMIN — REPAGLINIDE 1 MG: 1 TABLET ORAL at 12:52

## 2025-02-07 RX ADMIN — AMLODIPINE BESYLATE 5 MG: 5 TABLET ORAL at 09:24

## 2025-02-07 RX ADMIN — SENNOSIDES AND DOCUSATE SODIUM 1 TABLET: 8.6; 5 TABLET ORAL at 23:56

## 2025-02-07 RX ADMIN — CARVEDILOL 12.5 MG: 12.5 TABLET, FILM COATED ORAL at 09:24

## 2025-02-07 RX ADMIN — REPAGLINIDE 1 MG: 1 TABLET ORAL at 09:24

## 2025-02-07 RX ADMIN — INSULIN ASPART 3 UNITS: 100 INJECTION, SOLUTION INTRAVENOUS; SUBCUTANEOUS at 17:27

## 2025-02-07 RX ADMIN — ANTI-FUNGAL POWDER MICONAZOLE NITRATE TALC FREE 1 APPLIC: 1.42 POWDER TOPICAL at 20:04

## 2025-02-07 RX ADMIN — DAPAGLIFLOZIN 10 MG: 10 TABLET, FILM COATED ORAL at 09:25

## 2025-02-07 RX ADMIN — ENOXAPARIN SODIUM 40 MG: 40 INJECTION SUBCUTANEOUS at 09:25

## 2025-02-07 RX ADMIN — INSULIN ASPART 2 UNITS: 100 INJECTION, SOLUTION INTRAVENOUS; SUBCUTANEOUS at 12:52

## 2025-02-07 NOTE — CM
"Addendum entered by Carolina Corcoran  02/07/25 15:20: "~"Called [employer] to check on status of auth  [phone]"~"Spoke with Eunice - Ref # [id number]"~"Auth # [id number] - remains pending"~"Zheng at [place] updated"~"Addendum entered by Carolina Corcoran  02/07/25 13:34: "~"Called [employer] to check on status of auth "~"Spoke with Lefty"~"Auth remains pending "~"Re # - [id number]"~"Plan - transfer to [facility] when auth obtained"~"R - [phone]"~"F - [phone]"~"Original Note:"~"Called [employer] - [phone] to check on status of auth"~"LM with call back number"~"Awaiting return call/fax"~"Plan - transfer to [facility] when auth obtained"~"R - [phone]"~"F - [phone]"

## 2025-02-07 NOTE — PTCARENOTE
"Patient needed much encouragement to get OOB. Patient OOB to chair with max assist x1 and walker. Patient weak with very slow, small steps to chair. Patient dyspneic with activity, RA sat 96%. Patient placed on air cushion for comfort, ice to right "~"thigh, call bell in reach. Spouse at bedside."

## 2025-02-07 NOTE — W.PN.HOSP.TC
"Today's Communication/Plan"~"-"~"-: "~"."~"Assessment / Plan"~"Assessment / Plan"~"-: "~"1. Right Femur Intertrochanteric Fracture s/p Mechanical Fall"~"- R Hip XR: Comminuted intertrochanteric fracture, proximal right femur"~"- R Femur XR: Intertrochanteric fracture, proximal right femur"~"- POD# for cephalomedullary nail of the right hip/femur"~"- PT/OT for disposition planning, other wise medically stable for discharge. "~"- Medically stable for d/c since 2/6. Awaiting bed at [facility]. "~"2. Essential HTN"~"- Continue amlodipine, carvedilol, losartan"~"3. NIDDM"~" - AccuCheck AC & HS"~" - Sliding Scale Insulin"~" - continue empagliflozin, Lantus and repaglinide"~" - hold metformin"~" "~"4. hypercholesterolemia"~" - continue rosuvastatin"~"5. CKD stage III"~"- Creatinine 1.7 on admission, repeat this AM 1.7 despite IV fluids "~"- Queried [employer], has had 2 outpatient CMPs in the past year with Cr 1.6 (this is likely his new baseline) "~"- Patient has spoken with PCP regarding need to follow up with nephrologist for worsening kidney function"~"6. Hyperthyroidism"~" - Continue methimazole "~"Anticipated Discharge: Today"~"Subjective/Interval History"~"-"~"-: "~"Date of Service:  February 7, 2025"~"No acute complaints this AM, pain controlled. "~"Objective Data"~"-"~"Vital Signs: "~"Vital Signs"~"Temp	Pulse	Resp	BP	Pulse Ox"~" 99.3 F 	 70 	 16 	 145/73 	 94 "~" 02/07/25 07:27	 02/07/25 07:27	 02/07/25 07:27	 02/07/25 07:27	 02/07/25 07:27"~"I&O"~"	02/06/25	02/07/25	02/08/25"~"	06:59	06:59	06:59"~"Intake Total	2380 / 2380	2880 / 2880	"~"Output Total	2070 / 2070	1450 / 1450	"~"Balance	310 / 310	1430 / 1430	"~"Review of Systems"~"-"~"History Source: Patient"~"Constitutional: Reports No Symptoms"~"Respiratory: Reports No Symptoms"~"Cardiac: Reports No Symptoms"~"Musculoskeletal: Reports No Symptoms (pain controlled)"~"Neuro: Reports No Symptoms"~"Physical Exam"~"-"~"General: Well Developed, Well Nourished, No Apparent Distress and Conversant"~"HEENT: Normocephalic and Atraumatic"~"Respiratory: Clear to Auscultation; Negative Wheezes, Rales or Rhonchi"~"Cardiac: Regular Rhythm and S1/S2"~"Musculoskeletal: No Clubbing, No Cyanosis and No Edema"~"Skin: Warm and Dry"~"Neuro: Awake and Alert"~"Psych: Calm"

## 2025-02-07 NOTE — PTCARENOTE
Roxicodone 5mg given for right thigh discomfort. Patient states, 'It is not really pain, but more of a pressure.' Ice reapplied to right thigh. Spouse at bedside.

## 2025-02-08 VITALS — DIASTOLIC BLOOD PRESSURE: 99 MMHG | RESPIRATION RATE: 16 BRPM | SYSTOLIC BLOOD PRESSURE: 154 MMHG

## 2025-02-08 VITALS — SYSTOLIC BLOOD PRESSURE: 150 MMHG | RESPIRATION RATE: 16 BRPM | DIASTOLIC BLOOD PRESSURE: 78 MMHG

## 2025-02-08 VITALS — DIASTOLIC BLOOD PRESSURE: 75 MMHG | RESPIRATION RATE: 16 BRPM | SYSTOLIC BLOOD PRESSURE: 169 MMHG

## 2025-02-08 LAB
GLUCOSE - POINT OF CARE: 171 MG/DL (ref 70–99)
GLUCOSE - POINT OF CARE: 227 MG/DL (ref 70–99)
GLUCOSE - POINT OF CARE: 309 MG/DL (ref 70–99)
GLUCOSE - POINT OF CARE: 342 MG/DL (ref 70–99)

## 2025-02-08 RX ADMIN — INSULIN GLARGINE 0.12 UNITS: 100 INJECTION, SOLUTION SUBCUTANEOUS at 21:32

## 2025-02-08 RX ADMIN — INSULIN ASPART 1 UNITS: 100 INJECTION, SOLUTION INTRAVENOUS; SUBCUTANEOUS at 12:40

## 2025-02-08 RX ADMIN — ENOXAPARIN SODIUM 40 MG: 40 INJECTION SUBCUTANEOUS at 08:43

## 2025-02-08 RX ADMIN — DOCUSATE SODIUM 100 MG: 100 CAPSULE, LIQUID FILLED ORAL at 08:42

## 2025-02-08 RX ADMIN — REPAGLINIDE 1 MG: 1 TABLET ORAL at 08:42

## 2025-02-08 RX ADMIN — SENNOSIDES AND DOCUSATE SODIUM 1 TABLET: 8.6; 5 TABLET ORAL at 19:22

## 2025-02-08 RX ADMIN — CARVEDILOL 12.5 MG: 12.5 TABLET, FILM COATED ORAL at 08:42

## 2025-02-08 RX ADMIN — DOCUSATE SODIUM 100 MG: 100 CAPSULE, LIQUID FILLED ORAL at 19:26

## 2025-02-08 RX ADMIN — AMLODIPINE BESYLATE 5 MG: 5 TABLET ORAL at 08:42

## 2025-02-08 RX ADMIN — REPAGLINIDE 1 MG: 1 TABLET ORAL at 12:40

## 2025-02-08 RX ADMIN — INSULIN ASPART 1 UNITS: 100 INJECTION, SOLUTION INTRAVENOUS; SUBCUTANEOUS at 08:43

## 2025-02-08 RX ADMIN — ANTI-FUNGAL POWDER MICONAZOLE NITRATE TALC FREE 1 APPLIC: 1.42 POWDER TOPICAL at 19:21

## 2025-02-08 RX ADMIN — ANTI-FUNGAL POWDER MICONAZOLE NITRATE TALC FREE 1 APPLIC: 1.42 POWDER TOPICAL at 08:44

## 2025-02-08 RX ADMIN — OXYCODONE HYDROCHLORIDE 10 MG: 10 TABLET ORAL at 14:11

## 2025-02-08 RX ADMIN — CARVEDILOL 12.5 MG: 12.5 TABLET, FILM COATED ORAL at 19:22

## 2025-02-08 RX ADMIN — INSULIN ASPART 4 UNITS: 100 INJECTION, SOLUTION INTRAVENOUS; SUBCUTANEOUS at 17:58

## 2025-02-08 RX ADMIN — SENNOSIDES AND DOCUSATE SODIUM 1 TABLET: 8.6; 5 TABLET ORAL at 08:43

## 2025-02-08 RX ADMIN — DAPAGLIFLOZIN 10 MG: 10 TABLET, FILM COATED ORAL at 08:42

## 2025-02-08 RX ADMIN — REPAGLINIDE 1 MG: 1 TABLET ORAL at 17:13

## 2025-02-08 RX ADMIN — ROSUVASTATIN CALCIUM 10 MG: 10 TABLET, FILM COATED ORAL at 08:42

## 2025-02-08 RX ADMIN — METHIMAZOLE 5 MG: 5 TABLET ORAL at 08:42

## 2025-02-08 NOTE — W.PN.HOSP.TC
"Today's Communication/Plan"~"-"~"-: "~"Discharge to [facility]. "~"Assessment / Plan"~"Assessment / Plan"~"-: "~"1. Right Femur Intertrochanteric Fracture s/p Mechanical Fall"~"- R Hip XR: Comminuted intertrochanteric fracture, proximal right femur"~"- R Femur XR: Intertrochanteric fracture, proximal right femur"~"- POD# for cephalomedullary nail of the right hip/femur"~"- PT/OT for disposition planning, other wise medically stable for discharge. "~"- Medically stable for d/c since 2/6. Awaiting bed at [facility]. "~"2. Essential HTN"~"- Continue amlodipine, carvedilol, losartan"~"3. NIDDM"~" - AccuCheck AC & HS"~" - Sliding Scale Insulin"~" - continue empagliflozin, Lantus and repaglinide"~" - hold metformin"~" "~"4. hypercholesterolemia"~" - continue rosuvastatin"~"5. CKD stage III"~"- Creatinine 1.7 on admission, repeat this AM 1.7 despite IV fluids "~"- [facility], has had 2 outpatient CMPs in the past year with Cr 1.6 (this is likely his new baseline) "~"- Patient has spoken with PCP regarding need to follow up with nephrologist for worsening kidney function"~"6. Hyperthyroidism"~" - Continue methimazole "~"Anticipated Discharge: Today"~"Subjective/Interval History"~"-"~"-: "~"Date of Service:  February 8, 2025"~"Patient seen and examined while resting comfortably in bed, watching TV. Awaiting authorization for discharge to SNF. Patient has no acute complaints this AM. When discussing yesterday's PT, patient states he is still a little sore but subjectively "~"feels like he has better mobility than the day before. "~"Objective Data"~"-"~"Vital Signs: "~"Vital Signs"~"Temp	Pulse	Resp	BP	Pulse Ox"~" 99.0 F 	 81 	 14 	 163/72 	 97 "~" 02/07/25 23:00	 02/07/25 23:00	 02/07/25 23:00	 02/07/25 23:00	 02/07/25 23:00"~"I&O"~"	02/07/25	02/08/25	02/09/25"~"	06:59	06:59	06:59"~"Intake Total	2880 / 2880	1380 / 1380	"~"Output Total	1450 / 1450	2950 / 2950	"~"Balance	1430 / 1430	-1570 / -1570	"~"Review of Systems"~"-"~"History Source: Patient"~"Constitutional: Reports No Symptoms"~"Respiratory: Reports No Symptoms"~"Cardiac: Reports No Symptoms"~"Abdomen/GI: Reports No Symptoms"~"Musculoskeletal: Reports No Symptoms"~"Neuro: Reports No Symptoms"~"Physical Exam"~"-"~"General: No Apparent Distress, Comfortable and Conversant"~"HEENT: Normocephalic and Atraumatic"~"Respiratory: Clear to Auscultation and Non Labored Respirations; Negative Wheezes, Rales, Rhonchi or Crackles"~"Cardiac: Regular Rhythm and S1/S2"~"GI: Soft, Nontender and Nondistended"~"Musculoskeletal: No Clubbing, No Cyanosis and No Edema"~"Skin: Warm and Dry"~"Neuro: Awake, Alert and Oriented"~"Psych: Calm"

## 2025-02-08 NOTE — CM
"Auth still pending. Called both [phone] and [phone] and left VM with return call requested. "~"[facility] unable to accept after 3p Saturday until earliest, Monday. "

## 2025-02-09 VITALS — SYSTOLIC BLOOD PRESSURE: 160 MMHG | RESPIRATION RATE: 16 BRPM | DIASTOLIC BLOOD PRESSURE: 76 MMHG

## 2025-02-09 VITALS — SYSTOLIC BLOOD PRESSURE: 158 MMHG | DIASTOLIC BLOOD PRESSURE: 66 MMHG | RESPIRATION RATE: 16 BRPM

## 2025-02-09 VITALS — RESPIRATION RATE: 16 BRPM | DIASTOLIC BLOOD PRESSURE: 68 MMHG | SYSTOLIC BLOOD PRESSURE: 130 MMHG

## 2025-02-09 LAB
BUN SERPL-MCNC: 27 MG/DL (ref 9–20)
CALCIUM SERPL-MCNC: 8.2 MG/DL (ref 8.4–10.2)
CHLORIDE SERPL-SCNC: 106 MMOL/L (ref 98–107)
CO2 SERPL-SCNC: 25 MMOL/L (ref 22–30)
EGFR: 48.85
ERYTHROCYTE [DISTWIDTH] IN BLOOD BY AUTOMATED COUNT: 13 % (ref 11.5–14.5)
ESTIMATED CREATININE CLEARANCE: 50 ML/MIN
GLUCOSE - POINT OF CARE: 175 MG/DL (ref 70–99)
GLUCOSE - POINT OF CARE: 244 MG/DL (ref 70–99)
GLUCOSE - POINT OF CARE: 282 MG/DL (ref 70–99)
GLUCOSE - POINT OF CARE: 300 MG/DL (ref 70–99)
GLUCOSE SERPL-MCNC: 150 MG/DL (ref 70–99)
HCT VFR BLD AUTO: 28.3 % (ref 39–52)
HGB BLD-MCNC: 9.4 G/DL (ref 13–18)
MCHC RBC AUTO-ENTMCNC: 33.2 G/DL (ref 33–37)
MCV RBC AUTO: 91 FL (ref 80–94)
PLATELET # BLD AUTO: 171 10^3/UL (ref 130–400)
POTASSIUM SERPL-SCNC: 3.9 MMOL/L (ref 3.5–5.1)
SODIUM SERPL-SCNC: 139 MMOL/L (ref 135–145)

## 2025-02-09 RX ADMIN — DOCUSATE SODIUM 100 MG: 100 CAPSULE, LIQUID FILLED ORAL at 19:22

## 2025-02-09 RX ADMIN — DAPAGLIFLOZIN 10 MG: 10 TABLET, FILM COATED ORAL at 08:36

## 2025-02-09 RX ADMIN — DOCUSATE SODIUM 100 MG: 100 CAPSULE, LIQUID FILLED ORAL at 08:36

## 2025-02-09 RX ADMIN — OXYCODONE HYDROCHLORIDE 5 MG: 5 TABLET ORAL at 17:22

## 2025-02-09 RX ADMIN — METHIMAZOLE 5 MG: 5 TABLET ORAL at 08:35

## 2025-02-09 RX ADMIN — ENOXAPARIN SODIUM 40 MG: 40 INJECTION SUBCUTANEOUS at 08:34

## 2025-02-09 RX ADMIN — AMLODIPINE BESYLATE 10 MG: 10 TABLET ORAL at 08:40

## 2025-02-09 RX ADMIN — CARVEDILOL 12.5 MG: 12.5 TABLET, FILM COATED ORAL at 19:22

## 2025-02-09 RX ADMIN — INSULIN ASPART 4 UNITS: 100 INJECTION, SOLUTION INTRAVENOUS; SUBCUTANEOUS at 17:23

## 2025-02-09 RX ADMIN — AMLODIPINE BESYLATE: 5 TABLET ORAL at 09:02

## 2025-02-09 RX ADMIN — ANTI-FUNGAL POWDER MICONAZOLE NITRATE TALC FREE 1 APPLIC: 1.42 POWDER TOPICAL at 21:10

## 2025-02-09 RX ADMIN — REPAGLINIDE 1 MG: 1 TABLET ORAL at 17:22

## 2025-02-09 RX ADMIN — SENNOSIDES AND DOCUSATE SODIUM 1 TABLET: 8.6; 5 TABLET ORAL at 19:22

## 2025-02-09 RX ADMIN — REPAGLINIDE 1 MG: 1 TABLET ORAL at 13:31

## 2025-02-09 RX ADMIN — INSULIN ASPART 1 UNITS: 100 INJECTION, SOLUTION INTRAVENOUS; SUBCUTANEOUS at 08:33

## 2025-02-09 RX ADMIN — CARVEDILOL 12.5 MG: 12.5 TABLET, FILM COATED ORAL at 08:37

## 2025-02-09 RX ADMIN — ANTI-FUNGAL POWDER MICONAZOLE NITRATE TALC FREE 1 APPLIC: 1.42 POWDER TOPICAL at 08:41

## 2025-02-09 RX ADMIN — INSULIN ASPART 3 UNITS: 100 INJECTION, SOLUTION INTRAVENOUS; SUBCUTANEOUS at 12:28

## 2025-02-09 RX ADMIN — INSULIN GLARGINE 0.12 UNITS: 100 INJECTION, SOLUTION SUBCUTANEOUS at 21:11

## 2025-02-09 RX ADMIN — SENNOSIDES AND DOCUSATE SODIUM 1 TABLET: 8.6; 5 TABLET ORAL at 08:37

## 2025-02-09 RX ADMIN — OXYCODONE HYDROCHLORIDE 10 MG: 10 TABLET ORAL at 11:07

## 2025-02-09 RX ADMIN — ROSUVASTATIN CALCIUM 10 MG: 10 TABLET, FILM COATED ORAL at 08:36

## 2025-02-09 RX ADMIN — REPAGLINIDE 1 MG: 1 TABLET ORAL at 08:36

## 2025-02-09 NOTE — W.PN.HOSP.TC
"Today's Communication/Plan"~"-"~"-: "~"XRs R Foot, ankle, knee, otherwise medically stable for d/c, likely 2/10"~"Assessment / Plan"~"Assessment / Plan"~"-: "~"1. Right Femur Intertrochanteric Fracture s/p Mechanical Fall"~"- R Hip XR: Comminuted intertrochanteric fracture, proximal right femur"~"- R Femur XR: Intertrochanteric fracture, proximal right femur"~"- POD#4 for cephalomedullary nail of the right hip/femur"~"- PT/OT for disposition planning, other wise medically stable for discharge. "~"- Medically stable for d/c since 2/6. Awaiting bed at [facility]. Earliest bed available (2/10)"~"	- (2/9) c/o pain in R foot, ankle, knee that does not allow him to participate in PT fully; requesting XRs "~"2. Essential HTN"~"- Continue amlodipine, carvedilol, losartan"~"3. NIDDM"~" - AccuCheck AC & HS"~" - Sliding Scale Insulin"~" - continue empagliflozin, Lantus and repaglinide"~" - hold metformin"~" "~"4. hypercholesterolemia"~" - continue rosuvastatin"~"5. CKD stage III"~"- Creatinine 1.7 on admission, repeat this AM 1.7 despite IV fluids "~"- [facility], has had 2 outpatient CMPs in the past year with Cr 1.6 (this is likely his new baseline) "~"- Patient has spoken with PCP regarding need to follow up with nephrologist for worsening kidney function"~"6. Hyperthyroidism"~" - Continue methimazole "~"Anticipated Discharge: Within 24 hours"~"Subjective/Interval History"~"-"~"-: "~"Date of Service:  February 9, 2025"~"Patient seen and examined while resting comfortably in bed, watching TV. Patient states that he is having pain in the right knee, right ankle, and right foot (base of the right big toe) that is keeping him from fully participating in physical "~"therapy. Patient otherwise denies any acute complaints. "~"Objective Data"~"-"~"Labs: "~"Laboratory Results"~" 	02/09/25"~" 	08:47"~"WBC	 9.8"~"Hgb	 9.4 L"~"Hct	 28.3 L"~"Plt Count	 171"~"Sodium	 139"~"Potassium	 3.9"~"Chloride	 106"~"Carbon Dioxide	 25"~"BUN	 27 H"~"Creatinine	 1.5 H"~"Glucose	 150 H"~"Calcium	 8.2 L"~"Vital Signs: "~"Vital Signs"~"Temp	Pulse	Resp	BP	Pulse Ox"~" 98.1 F 	 60 	 16 	 154/99 	 98 "~" 02/09/25 07:19	 02/09/25 07:19	 02/09/25 07:19	 02/09/25 08:40	 02/09/25 07:19"~"I&O"~"	02/08/25	02/09/25	02/10/25"~"	06:59	06:59	06:59"~"Intake Total	1380 / 1380	1200 / 1200	"~"Output Total	2950 / 2950	1900 / 1900	"~"Balance	-1570 / -1570	-700 / -700	"~"Review of Systems"~"-"~"Constitutional: Reports No Symptoms"~"Respiratory: Reports No Symptoms"~"Cardiac: Reports No Symptoms"~"Abdomen/GI: Reports No Symptoms"~"Musculoskeletal: Reports Joint Pain"~"Neuro: Reports No Symptoms"~"Physical Exam"~"-"~"General: No Apparent Distress, Comfortable and Obese"~"HEENT: Normocephalic and Atraumatic"~"Respiratory: Clear to Auscultation; Negative Wheezes, Rales or Rhonchi"~"Cardiac: Regular Rhythm and S1/S2"~"GI: Soft and Nontender"~"Musculoskeletal: No Clubbing, No Cyanosis, No Edema and Other (mild tenderness to palpation of the soft tissue of the bottom of the right foot proximal to the base of the right big toe)"~"Skin: Warm and Dry"~"Neuro: Awake, Alert and Oriented"~"Psych: Calm"~"Data Reviewed"~"-"~"Labs: Labs Reviewed by me"

## 2025-02-10 VITALS — HEART RATE: 73 BPM | OXYGEN SATURATION: 95 % | DIASTOLIC BLOOD PRESSURE: 68 MMHG | SYSTOLIC BLOOD PRESSURE: 157 MMHG

## 2025-02-10 VITALS — SYSTOLIC BLOOD PRESSURE: 157 MMHG | HEART RATE: 58 BPM | DIASTOLIC BLOOD PRESSURE: 72 MMHG

## 2025-02-10 VITALS — DIASTOLIC BLOOD PRESSURE: 82 MMHG | SYSTOLIC BLOOD PRESSURE: 176 MMHG | RESPIRATION RATE: 16 BRPM

## 2025-02-10 VITALS — DIASTOLIC BLOOD PRESSURE: 60 MMHG | RESPIRATION RATE: 16 BRPM | SYSTOLIC BLOOD PRESSURE: 121 MMHG

## 2025-02-10 LAB — GLUCOSE - POINT OF CARE: 185 MG/DL (ref 70–99)

## 2025-02-10 RX ADMIN — REPAGLINIDE 1 MG: 1 TABLET ORAL at 08:41

## 2025-02-10 RX ADMIN — INSULIN ASPART 1 UNITS: 100 INJECTION, SOLUTION INTRAVENOUS; SUBCUTANEOUS at 08:41

## 2025-02-10 RX ADMIN — METHIMAZOLE 5 MG: 5 TABLET ORAL at 08:40

## 2025-02-10 RX ADMIN — ROSUVASTATIN CALCIUM 10 MG: 10 TABLET, FILM COATED ORAL at 08:40

## 2025-02-10 RX ADMIN — ENOXAPARIN SODIUM 40 MG: 40 INJECTION SUBCUTANEOUS at 08:43

## 2025-02-10 RX ADMIN — SENNOSIDES AND DOCUSATE SODIUM 1 TABLET: 8.6; 5 TABLET ORAL at 08:40

## 2025-02-10 RX ADMIN — DAPAGLIFLOZIN 10 MG: 10 TABLET, FILM COATED ORAL at 08:40

## 2025-02-10 RX ADMIN — AMLODIPINE BESYLATE 10 MG: 10 TABLET ORAL at 08:40

## 2025-02-10 RX ADMIN — ANTI-FUNGAL POWDER MICONAZOLE NITRATE TALC FREE 1 APPLIC: 1.42 POWDER TOPICAL at 08:42

## 2025-02-10 RX ADMIN — CARVEDILOL 12.5 MG: 12.5 TABLET, FILM COATED ORAL at 08:40

## 2025-02-10 RX ADMIN — OXYCODONE HYDROCHLORIDE 5 MG: 5 TABLET ORAL at 08:51

## 2025-02-10 RX ADMIN — DOCUSATE SODIUM 100 MG: 100 CAPSULE, LIQUID FILLED ORAL at 08:41

## 2025-02-10 NOTE — W.DCSUMMARY
"Addendum entered and electronically signed by Catalino Encinas MD  02/11/25 00:38: "~"Read, reviewed. See same day progress note for additional details. DC meds to be added- Lovenox x 28 days  and increased norvasc. Called facility to verify. "~"Eyad Encinas MD"~"Original Note:"~"Documented by User:  Frank Cr DO, Resident  02/10/25 17:35"~"Discharge Summary"~"Discharge Data"~"Date of Admission: 02/04/25"~"Date of Discharge: 02/10/25"~"-"~"Pending Results: No"~"Hospital Course"~"-: "~"Karel Robin is a 74-year-old male with a past medical history of hypertension, type-2 diabetes mellitus, hypercholesterolemia, chronic kidney disease stage III, and hyperthyroidism who presented to the [place] emergency department for "~"evaluation of right hip pain after a mechanical fall. Patient reported that he tripped over [address] to [facility]. The patient did not report any antecedent or subsequent symptoms and did not lose consciousness."~"ED COURSE"~"In the emergency department, the patient complained of right hip pain, and exhibited an externally rotated right hip with increased pain with internal rotation. He was otherwise neurovascularly intact. Hip and femur x-rays of the right leg revealed "~"a comminuted intertrochanteric fracture of the proximal right femur. Patient was admitted for orthopedic surgery. "~"HOSPITAL COURSE"~"Orthopedic surgery was consulted and the patient underwent a cephalomedullary nail procedure of the right hip/femur. Surgery was successful and repeat imaging showed improved alignment. The patient was medically stable for discharge, but remained in "~"the hospital while a bed was procured. In this time, the patient underwent daily physical therapy, which caused the patient some pain in the right leg. X-ray imaging of the right ankle, right foot, and right knee did not reveal any pathology. The "~"patient was discharged to skilled nursing facility on 2/10/25 for further physical therapy and rehabilitation. "~"DISCHARGE RECOMMENDATIONS "~"	Continue physical therapy and rehabilitation at skilled nursing facility. "~"	Patient given a prescription for as needed oxycodone for pain. "~"	Follow up with primary care provider in less than one week for further recommendations. "~"		The patient's creatinine ranged from 1.5-1.7 during admission, which is elevated from past admissions. The primary care provider should obtain a follow up basic metabolic panel and assess renal function. "~"		The patient required higher doses of Norvasc while admitted. Although this is likely secondary to pain, it should be followed up by the primary care provider. "~"		The patient had acute blood loss anemia following his procedure. Follow up with a complete blood count in one week. "~"	Follow up with orthopedic surgeon as advised. "~"Discharge Plan"~"-"~"Patient Disposition: Nursing Home/SNF"~"Discharge Diagnosis/Procedures: Right Femur Intertrochanteric Fracture "~"Cephalomedullary Nail  "~"Condition: Fair"~"Diet: Diabetic, Carb Controlled"~"Activity: Other activity"~"Additional Activity: As directed by your orthopedic surgeon.  "~"Bathing Restrictions: As directed by Orthopedic Surgeon"~"Referrals:"~"Wanda Ko PA-C [Family Provider] - in less than 1 week"~"Prescriptions:"~"New"~"  oxycodone 5 mg Tablet "~"   5 mg PO Q4HPRN PRN (Reason: severe pain) Qty: 18 0RF"~"Continued"~"  rosuvastatin [Crestor] 10 MG tablet "~"   10 mg PO DAILY "~"  metformin 500 mg Tablet "~"   500 mg PO BIDWMEAL "~"  insulin glargine [Lantus Solostar U-100 Insulin] 100 unit/mL (3 mL) Insulin Pen "~"   25 unit SC HS "~"  carvedilol 12.5 mg tablet "~"   12.5 mg PO BID "~"  aspirin 81 mg Tablet,Delayed Release (Dr/Ec) "~"   81 mg PO DAILY "~"  amlodipine 5 mg Tablet "~"   5 mg PO DAILY Qty: 30 0RF"~"  methimazole 5 mg Tablet "~"   5 mg PO DAILY Qty: 30 0RF"~"  losartan 50 mg tablet "~"   50 mg PO DAILY "~"  repaglinide 1 mg tablet "~"   1 mg PO AC "~"  Jardiance 10 mg tablet "~"   10 mg PO DAILY "~"Discharge Orders:"~"Discharge Patient  (As Directed); Ordered 02/08/25"~"   Ordered By:  Frank Cr"~"Discharge Date and Time"~"Discharge Date/Time: 02/10/25 12:09"~"Print Language: ENGLISH"~"___________________________________________________________________________"~"Documented by User:  Catalino Encinas MD  02/11/25 00:29"~"Discharge Summary"~"Discharge Data"~"Date of Admission: 02/04/25"~"Date of Discharge: 02/11/25"~"Discharge Plan"~"-"~"Patient Disposition: Nursing Home/SNF"~"Discharge Diagnosis/Procedures: Right Femur Intertrochanteric Fracture "~"Cephalomedullary Nail  "~"Condition: Fair"~"Diet: Diabetic, Carb Controlled"~"Activity: Other activity"~"Additional Activity: As directed by your orthopedic surgeon.  "~"Bathing Restrictions: As directed by Orthopedic Surgeon"~"Referrals:"~"Wanda Ko PA-C [Family Provider] - in less than 1 week"~"Prescriptions:"~"New"~"  oxycodone 5 mg Tablet "~"   5 mg PO Q4HPRN PRN (Reason: severe pain) Qty: 18 0RF"~"Continued"~"  rosuvastatin [Crestor] 10 MG tablet "~"   10 mg PO DAILY "~"  metformin 500 mg Tablet "~"   500 mg PO BIDWMEAL "~"  insulin glargine [Lantus Solostar U-100 Insulin] 100 unit/mL (3 mL) Insulin Pen "~"   25 unit SC HS "~"  carvedilol 12.5 mg tablet "~"   12.5 mg PO BID "~"  aspirin 81 mg Tablet,Delayed Release (Dr/Ec) "~"   81 mg PO DAILY "~"  amlodipine 5 mg Tablet "~"   5 mg PO DAILY Qty: 30 0RF"~"  methimazole 5 mg Tablet "~"   5 mg PO DAILY Qty: 30 0RF"~"  losartan 50 mg tablet "~"   50 mg PO DAILY "~"  repaglinide 1 mg tablet "~"   1 mg PO AC "~"  Jardiance 10 mg tablet "~"   10 mg PO DAILY "~"Discharge Orders:"~"Discharge Patient  (As Directed); Ordered 02/08/25"~"   Ordered By:  Frank Cr"~"Discharge Date and Time"~"Discharge Date/Time: 02/10/25 12:09"~"Print Language: ENGLISH"

## 2025-02-10 NOTE — CM
"Addendum entered by Carolina Corcoran  02/10/25 10:40: "~"Pt given IMM"~"Transport arranged for 1200"~"Pts wife notified of transfer and transport time"~"Spoke with [facility] at [facility] - aware of transport time"~"Original Note:"~"Auth obtained"~"Approved for skilled level 2"~"[id number]"~"14 days - 2/8-2/21"~"Clinical updates due 2/20"~"Fax [phone]"~"TT sent to Dr Encinas and Dr Carmichael made aware"~"Spoke with Mary at [facility] - given auth info - requesting early transport for admission to [facility]"~"Plan - Transfer to [facility]"~"R - [phone]"~"F - [phone]"